# Patient Record
Sex: MALE | Race: WHITE | NOT HISPANIC OR LATINO | Employment: OTHER | URBAN - METROPOLITAN AREA
[De-identification: names, ages, dates, MRNs, and addresses within clinical notes are randomized per-mention and may not be internally consistent; named-entity substitution may affect disease eponyms.]

---

## 2023-08-31 PROBLEM — K21.9 GASTROESOPHAGEAL REFLUX DISEASE: Status: ACTIVE | Noted: 2023-08-31

## 2023-08-31 PROBLEM — E78.2 MIXED HYPERLIPIDEMIA: Status: ACTIVE | Noted: 2023-08-31

## 2023-08-31 PROBLEM — I25.118 ATHEROSCLEROTIC HEART DISEASE OF NATIVE CORONARY ARTERY WITH OTHER FORMS OF ANGINA PECTORIS (CMS-HCC): Status: ACTIVE | Noted: 2023-08-31

## 2023-08-31 PROBLEM — R73.01 IMPAIRED FASTING GLUCOSE: Status: ACTIVE | Noted: 2023-08-31

## 2023-08-31 PROBLEM — M54.30 SCIATICA: Status: ACTIVE | Noted: 2023-08-31

## 2023-08-31 PROBLEM — I70.90 ARTERIOSCLEROTIC VASCULAR DISEASE: Status: ACTIVE | Noted: 2023-08-31

## 2023-08-31 PROBLEM — N40.0 BENIGN PROSTATIC HYPERPLASIA: Status: ACTIVE | Noted: 2023-08-31

## 2023-08-31 PROBLEM — E78.00 PURE HYPERCHOLESTEROLEMIA: Status: ACTIVE | Noted: 2023-08-31

## 2023-08-31 PROBLEM — M51.369 DEGENERATION OF LUMBAR INTERVERTEBRAL DISC: Status: ACTIVE | Noted: 2023-08-31

## 2023-08-31 PROBLEM — M51.36 DEGENERATION OF LUMBAR INTERVERTEBRAL DISC: Status: ACTIVE | Noted: 2023-08-31

## 2023-08-31 PROBLEM — M19.90 OSTEOARTHRITIS: Status: ACTIVE | Noted: 2023-08-31

## 2023-08-31 PROBLEM — I10 ESSENTIAL HYPERTENSION: Status: ACTIVE | Noted: 2023-08-31

## 2023-08-31 PROBLEM — I20.9 ANGINA PECTORIS (CMS-HCC): Status: ACTIVE | Noted: 2023-08-31

## 2023-08-31 PROBLEM — R00.1 BRADYCARDIA: Status: ACTIVE | Noted: 2023-08-31

## 2023-08-31 PROBLEM — M54.16 LUMBAR RADICULITIS: Status: ACTIVE | Noted: 2023-08-31

## 2023-08-31 PROBLEM — M46.1 SACROILIITIS, NOT ELSEWHERE CLASSIFIED (CMS-HCC): Status: ACTIVE | Noted: 2023-08-31

## 2023-08-31 PROBLEM — M47.816 LUMBAR SPONDYLOSIS: Status: ACTIVE | Noted: 2023-08-31

## 2023-08-31 PROBLEM — R42 DIZZINESS AND GIDDINESS: Status: ACTIVE | Noted: 2023-08-31

## 2023-08-31 PROBLEM — M70.20 OLECRANON BURSITIS: Status: ACTIVE | Noted: 2023-08-31

## 2023-08-31 PROBLEM — M77.10 LATERAL EPICONDYLITIS: Status: ACTIVE | Noted: 2023-08-31

## 2023-08-31 RX ORDER — OMEPRAZOLE 20 MG/1
CAPSULE, DELAYED RELEASE ORAL
COMMUNITY

## 2023-08-31 RX ORDER — ATENOLOL AND CHLORTHALIDONE TABLET 50; 25 MG/1; MG/1
0.5 TABLET ORAL DAILY
COMMUNITY
End: 2024-03-25 | Stop reason: WASHOUT

## 2023-08-31 RX ORDER — DICLOFENAC SODIUM 10 MG/G
GEL TOPICAL
COMMUNITY
Start: 2019-11-05

## 2023-08-31 RX ORDER — ATORVASTATIN CALCIUM 80 MG/1
80 TABLET, FILM COATED ORAL DAILY
COMMUNITY
End: 2024-02-12

## 2023-08-31 RX ORDER — CHOLECALCIFEROL (VITAMIN D3) 50 MCG
TABLET ORAL DAILY
COMMUNITY
Start: 2010-12-08

## 2023-08-31 RX ORDER — ATENOLOL 25 MG/1
25 TABLET ORAL DAILY
COMMUNITY
End: 2024-01-02 | Stop reason: ALTCHOICE

## 2023-10-19 ENCOUNTER — LAB (OUTPATIENT)
Dept: LAB | Facility: LAB | Age: 78
End: 2023-10-19
Payer: MEDICARE

## 2023-10-19 DIAGNOSIS — I25.10 ATHEROSCLEROTIC HEART DISEASE OF NATIVE CORONARY ARTERY WITHOUT ANGINA PECTORIS: ICD-10-CM

## 2023-10-19 DIAGNOSIS — Z12.5 ENCOUNTER FOR SCREENING FOR MALIGNANT NEOPLASM OF PROSTATE: Primary | ICD-10-CM

## 2023-10-19 LAB
ALBUMIN SERPL-MCNC: 4.3 G/DL (ref 3.5–5)
ALP BLD-CCNC: 73 U/L (ref 35–125)
ALT SERPL-CCNC: 33 U/L (ref 5–40)
ANION GAP SERPL CALC-SCNC: 12 MMOL/L
AST SERPL-CCNC: 25 U/L (ref 5–40)
BILIRUB SERPL-MCNC: 0.6 MG/DL (ref 0.1–1.2)
BUN SERPL-MCNC: 24 MG/DL (ref 8–25)
CALCIUM SERPL-MCNC: 8.8 MG/DL (ref 8.5–10.4)
CHLORIDE SERPL-SCNC: 104 MMOL/L (ref 97–107)
CHOLEST SERPL-MCNC: 115 MG/DL (ref 133–200)
CHOLEST/HDLC SERPL: 2.5 {RATIO}
CO2 SERPL-SCNC: 25 MMOL/L (ref 24–31)
CREAT SERPL-MCNC: 1 MG/DL (ref 0.4–1.6)
GFR SERPL CREATININE-BSD FRML MDRD: 78 ML/MIN/1.73M*2
GLUCOSE SERPL-MCNC: 115 MG/DL (ref 65–99)
HDLC SERPL-MCNC: 46 MG/DL
LDLC SERPL CALC-MCNC: 53 MG/DL (ref 65–130)
POTASSIUM SERPL-SCNC: 4.8 MMOL/L (ref 3.4–5.1)
PROT SERPL-MCNC: 6.6 G/DL (ref 5.9–7.9)
PSA SERPL-MCNC: 1 NG/ML
SODIUM SERPL-SCNC: 141 MMOL/L (ref 133–145)
TRIGL SERPL-MCNC: 79 MG/DL (ref 40–150)

## 2023-10-19 PROCEDURE — 80053 COMPREHEN METABOLIC PANEL: CPT

## 2023-10-19 PROCEDURE — G0103 PSA SCREENING: HCPCS

## 2023-10-19 PROCEDURE — 80061 LIPID PANEL: CPT

## 2023-10-19 PROCEDURE — 36415 COLL VENOUS BLD VENIPUNCTURE: CPT

## 2023-11-28 ENCOUNTER — APPOINTMENT (OUTPATIENT)
Dept: PRIMARY CARE | Facility: CLINIC | Age: 78
End: 2023-11-28
Payer: MEDICARE

## 2024-01-02 ENCOUNTER — OFFICE VISIT (OUTPATIENT)
Dept: PRIMARY CARE | Facility: CLINIC | Age: 79
End: 2024-01-02
Payer: MEDICARE

## 2024-01-02 VITALS
HEIGHT: 72 IN | OXYGEN SATURATION: 98 % | WEIGHT: 253 LBS | SYSTOLIC BLOOD PRESSURE: 117 MMHG | TEMPERATURE: 97.5 F | BODY MASS INDEX: 34.27 KG/M2 | DIASTOLIC BLOOD PRESSURE: 66 MMHG | HEART RATE: 57 BPM

## 2024-01-02 DIAGNOSIS — I10 ESSENTIAL HYPERTENSION: ICD-10-CM

## 2024-01-02 DIAGNOSIS — E78.2 MIXED HYPERLIPIDEMIA: ICD-10-CM

## 2024-01-02 DIAGNOSIS — R73.01 IMPAIRED FASTING GLUCOSE: ICD-10-CM

## 2024-01-02 DIAGNOSIS — Z00.00 ROUTINE GENERAL MEDICAL EXAMINATION AT HEALTH CARE FACILITY: Primary | ICD-10-CM

## 2024-01-02 DIAGNOSIS — M15.9 PRIMARY OSTEOARTHRITIS INVOLVING MULTIPLE JOINTS: ICD-10-CM

## 2024-01-02 DIAGNOSIS — I70.90 ARTERIOSCLEROTIC VASCULAR DISEASE: ICD-10-CM

## 2024-01-02 DIAGNOSIS — N40.0 BENIGN PROSTATIC HYPERPLASIA WITHOUT LOWER URINARY TRACT SYMPTOMS: ICD-10-CM

## 2024-01-02 DIAGNOSIS — K63.5 POLYP OF COLON, UNSPECIFIED PART OF COLON, UNSPECIFIED TYPE: ICD-10-CM

## 2024-01-02 PROCEDURE — 1126F AMNT PAIN NOTED NONE PRSNT: CPT | Performed by: INTERNAL MEDICINE

## 2024-01-02 PROCEDURE — G0439 PPPS, SUBSEQ VISIT: HCPCS | Performed by: INTERNAL MEDICINE

## 2024-01-02 PROCEDURE — 99215 OFFICE O/P EST HI 40 MIN: CPT | Performed by: INTERNAL MEDICINE

## 2024-01-02 PROCEDURE — 3074F SYST BP LT 130 MM HG: CPT | Performed by: INTERNAL MEDICINE

## 2024-01-02 PROCEDURE — 1036F TOBACCO NON-USER: CPT | Performed by: INTERNAL MEDICINE

## 2024-01-02 PROCEDURE — 1159F MED LIST DOCD IN RCRD: CPT | Performed by: INTERNAL MEDICINE

## 2024-01-02 PROCEDURE — 3078F DIAST BP <80 MM HG: CPT | Performed by: INTERNAL MEDICINE

## 2024-01-02 ASSESSMENT — PATIENT HEALTH QUESTIONNAIRE - PHQ9
1. LITTLE INTEREST OR PLEASURE IN DOING THINGS: NOT AT ALL
SUM OF ALL RESPONSES TO PHQ9 QUESTIONS 1 AND 2: 0
1. LITTLE INTEREST OR PLEASURE IN DOING THINGS: NOT AT ALL
2. FEELING DOWN, DEPRESSED OR HOPELESS: NOT AT ALL
2. FEELING DOWN, DEPRESSED OR HOPELESS: NOT AT ALL
SUM OF ALL RESPONSES TO PHQ9 QUESTIONS 1 AND 2: 0

## 2024-01-02 ASSESSMENT — ENCOUNTER SYMPTOMS
OCCASIONAL FEELINGS OF UNSTEADINESS: 0
PALPITATIONS: 0
LOSS OF SENSATION IN FEET: 0
SHORTNESS OF BREATH: 0
DEPRESSION: 0

## 2024-01-02 ASSESSMENT — PAIN SCALES - GENERAL: PAINLEVEL: 0-NO PAIN

## 2024-01-02 NOTE — PROGRESS NOTES
[facility]: [facility]  MEDICARE WELLNESS EXAM      Matthew Martínez is a 78 y.o. male that is presenting today for Annual Exam.    Assessment/Plan    Diagnoses and all orders for this visit:  Routine general medical examination at health care facility  Arteriosclerotic vascular disease  Comments:  Control risk factors, labs OK. Recheck prior to  next visit.  Essential hypertension  Comments:  BP doing OK. Would stay on 1/2 table atenolol/chlorathalidone 50/25 to prevent slow heart rate, etc.  Mixed hyperlipidemia  Comments:  Cholesterol levels doing well.  Orders:  -     Lipid Panel; Future  -     Comprehensive Metabolic Panel; Future  Impaired fasting glucose  -     Hemoglobin A1C; Future  -     CBC; Future  Benign prostatic hyperplasia without lower urinary tract symptoms  Comments:  6/24 PSA reorder.  Orders:  -     Prostate Specific Antigen; Future  Polyp of colon, unspecified part of colon, unspecified type  Comments:  10/21  recheck 10/24 last scope?  Primary osteoarthritis involving multiple joints    ADVANCED CARE PLANNING  Advanced Care Planning was discussed with patient:  The patient has an active advanced care plan on file. The patient has an active surrogate decision-maker on file.  Encouraged the patient to confirm that Living Will and Healthcare Power of  (HCPoA) are accurate and up to date.  Encouraged the patient to confirm that our office be provided a copy of any documentation in the event that anything changes.    ACTIVITIES OF DAILY LIVING  Basic ADLs:  Bathing: Independent, Dressing: Independent, Toileting: Independent, Transferring: Independent, Continence:     Independent, Feeding: Independent.    Instrumental ADLs:  Ability to use phone: Independent, Shopping: Independent, Cooking: Independent, House-keeping: Independent, Laundry: Independent, Transportation: Independent, Medication Management: Independent, Finance Management:  Independent.    Subjective   Wellness visit. Over all health status doing well. Diet reviewed, Mediterranean, low sugar diet suggested. No  active depression, or little interest in doing activites or hopelessness. Home safety reviewed, well light, no throw rugs,etc. No falls. Advanced directives filled out at home.  ADL, and instrumental ADL no limits doing well. Vision screen eye exam yearly, hearing screen 6 ft whisper test normal.  No cognitive decline observed. Screening sheet given.    Review of Systems   Respiratory:  Negative for shortness of breath.    Cardiovascular:  Negative for chest pain and palpitations.   All other systems reviewed and are negative.    Objective   Vitals:    01/02/24 1256   BP: 117/66   Pulse: 57   Temp: 36.4 °C (97.5 °F)   SpO2: 98%      Body mass index is 34.31 kg/m².  Physical Exam  Constitutional:       General: He is not in acute distress.     Appearance: He is obese. He is not toxic-appearing.   HENT:      Head: Normocephalic and atraumatic.      Right Ear: Tympanic membrane and ear canal normal.      Left Ear: Tympanic membrane and ear canal normal.      Nose: Nose normal.      Mouth/Throat:      Pharynx: Oropharynx is clear.   Eyes:      Extraocular Movements: Extraocular movements intact.      Pupils: Pupils are equal, round, and reactive to light.   Cardiovascular:      Rate and Rhythm: Normal rate and regular rhythm.      Heart sounds: Normal heart sounds. No murmur heard.  Pulmonary:      Breath sounds: Normal breath sounds.   Abdominal:      General: Bowel sounds are normal. There is no distension.      Palpations: Abdomen is soft. There is no mass.      Tenderness: There is no abdominal tenderness.      Comments: Diastasis recti   Musculoskeletal:         General: No swelling or tenderness.      Right lower leg: No edema.      Left lower leg: No edema.   Skin:     General: Skin is warm and dry.   Neurological:      General: No focal deficit present.      Mental Status:  "He is oriented to person, place, and time.      Sensory: No sensory deficit.      Motor: No weakness.      Deep Tendon Reflexes: Reflexes normal.     Diagnostic Results   Lab Results   Component Value Date    GLUCOSE 115 (H) 10/19/2023    CALCIUM 8.8 10/19/2023     10/19/2023    K 4.8 10/19/2023    CO2 25 10/19/2023     10/19/2023    BUN 24 10/19/2023    CREATININE 1.00 10/19/2023     Lab Results   Component Value Date    ALT 33 10/19/2023    AST 25 10/19/2023    ALKPHOS 73 10/19/2023    BILITOT 0.6 10/19/2023     Lab Results   Component Value Date    WBC 8.8 10/18/2022    HGB 14.8 10/18/2022    HCT 44.0 10/18/2022    MCV 93.6 10/18/2022     10/18/2022     Lab Results   Component Value Date    CHOL 115 (L) 10/19/2023    CHOL 102 (L) 10/18/2022    CHOL 115 (L) 06/15/2021     Lab Results   Component Value Date    HDL 46.0 10/19/2023    HDL 43 10/18/2022    HDL 41 06/15/2021     Lab Results   Component Value Date    LDLCALC 53 (L) 10/19/2023    LDLCALC 43 (L) 10/18/2022    LDLCALC 54 (L) 06/15/2021     Lab Results   Component Value Date    TRIG 79 10/19/2023    TRIG 80 10/18/2022    TRIG 99 06/15/2021     No components found for: \"CHOLHDL\"  Lab Results   Component Value Date    HGBA1C 5.9 12/21/2021     Other labs not included in the list above reviewed either before or during this encounter.    History   Past Medical History:   Diagnosis Date   • Arteriosclerotic vascular disease 08/31/2023    STENT, CAD 1/08 .   • Arteriosclerotic vascular disease 08/31/2023    Hx STENT , risk factor control.   • Benign prostatic hyperplasia 08/31/2023    10/22 PSA 0.9   • Benign prostatic hyperplasia 08/31/2023    10/22 PSA 0.9, 6/24 PSA reorder   • Essential hypertension 08/31/2023   • Gastroesophageal reflux disease 08/31/2023    U/S RUQ fatty liver 12/18   • History of basal cell cancer     left forearm  11/17, 1/24 yrly rec.   • Impaired fasting glucose 08/31/2023   • Osteoarthritis " 08/31/2023   • Pure hypercholesterolemia 08/31/2023     History reviewed. No pertinent surgical history.  Family History   Problem Relation Name Age of Onset   • Other (Rheumatic heart disease) Mother     • Heart disease Mother     • Lung cancer Father       Social History     Socioeconomic History   • Marital status: Unknown     Spouse name: Not on file   • Number of children: Not on file   • Years of education: Not on file   • Highest education level: Not on file   Occupational History   • Not on file   Tobacco Use   • Smoking status: Never     Passive exposure: Never   • Smokeless tobacco: Never   Vaping Use   • Vaping Use: Never used   Substance and Sexual Activity   • Alcohol use: Yes   • Drug use: Never   • Sexual activity: Not on file   Other Topics Concern   • Not on file   Social History Narrative   • Not on file     Social Determinants of Health     Financial Resource Strain: Not on file   Food Insecurity: Not on file   Transportation Needs: Not on file   Physical Activity: Not on file   Stress: Not on file   Social Connections: Not on file   Intimate Partner Violence: Not on file   Housing Stability: Not on file     No Known Allergies  Current Outpatient Medications on File Prior to Visit   Medication Sig Dispense Refill   • atenoloL-chlorthalidone (Tenoretic) 50-25 mg tablet Take 0.5 tablets by mouth once daily.     • atorvastatin (Lipitor) 80 mg tablet Take 1 tablet (80 mg) by mouth once daily.     • cholecalciferol (Vitamin D-3) 50 MCG (2000 UT) tablet Take by mouth once daily. With food     • diclofenac sodium (Voltaren) 1 % gel gel as directed Topical application every 4-6 hours as needed     • MULTIVITAMIN ORAL Take 1 tablet by mouth once daily.     • omeprazole (PriLOSEC) 20 mg DR capsule Oral for 90     • [DISCONTINUED] aspirin (ASPIR-81 ORAL) Take 1 tablet by mouth once daily.     • [DISCONTINUED] atenolol (Tenormin) 25 mg tablet Take 1 tablet (25 mg) by mouth once daily.       No current  facility-administered medications on file prior to visit.     Immunization History   Administered Date(s) Administered   • Flu vaccine, quadrivalent, high-dose, preservative free, age 65y+ (FLUZONE) 12/08/2020, 10/01/2021, 10/18/2022   • Influenza, High Dose Seasonal, Preservative Free 12/14/2016, 12/04/2018   • Influenza, injectable, quadrivalent 12/05/2017   • Influenza, seasonal, injectable 12/08/2010, 12/04/2012, 11/26/2013, 12/03/2015   • Moderna COVID-19 vaccine, bivalent, blue cap/gray label *Check age/dose* 10/28/2022   • Moderna SARS-CoV-2 Vaccination 03/09/2021, 04/06/2021, 01/06/2022   • Pneumococcal conjugate vaccine, 13-valent (PREVNAR 13) 12/03/2015   • Pneumococcal polysaccharide vaccine, 23-valent, age 2 years and older (PNEUMOVAX 23) 12/04/2012, 12/04/2018   • Tdap vaccine, age 7 year and older (BOOSTRIX) 12/08/2010, 12/18/2020   • Zoster vaccine, recombinant, adult (SHINGRIX) 02/21/2023, 05/30/2023   • Zoster, live 01/11/2011     Patient's medical history was reviewed and updated either before or during this encounter.     Michael Merritt MD

## 2024-02-12 DIAGNOSIS — I10 ESSENTIAL HYPERTENSION: Primary | ICD-10-CM

## 2024-02-12 RX ORDER — ATORVASTATIN CALCIUM 80 MG/1
80 TABLET, FILM COATED ORAL DAILY
Qty: 90 TABLET | Refills: 3 | Status: SHIPPED | OUTPATIENT
Start: 2024-02-12 | End: 2025-02-06

## 2024-02-12 RX ORDER — ATENOLOL 50 MG/1
25 TABLET ORAL 2 TIMES DAILY
COMMUNITY
Start: 2023-12-31

## 2024-02-12 NOTE — TELEPHONE ENCOUNTER
Pharmacy is requesting a refill on the pt behalf      Requested Prescriptions     Pending Prescriptions Disp Refills    atorvastatin (Lipitor) 80 mg tablet [Pharmacy Med Name: Atorvastatin Calcium Oral Tablet 80 MG] 90 tablet 3     Sig: Take 1 tablet (80 mg) by mouth once daily.

## 2024-03-25 ENCOUNTER — OFFICE VISIT (OUTPATIENT)
Dept: CARDIOLOGY | Facility: CLINIC | Age: 79
End: 2024-03-25
Payer: MEDICARE

## 2024-03-25 VITALS
WEIGHT: 250.2 LBS | SYSTOLIC BLOOD PRESSURE: 126 MMHG | RESPIRATION RATE: 18 BRPM | HEIGHT: 72 IN | TEMPERATURE: 98.9 F | DIASTOLIC BLOOD PRESSURE: 64 MMHG | OXYGEN SATURATION: 96 % | HEART RATE: 60 BPM | BODY MASS INDEX: 33.89 KG/M2

## 2024-03-25 DIAGNOSIS — I70.90 ARTERIOSCLEROTIC VASCULAR DISEASE: ICD-10-CM

## 2024-03-25 DIAGNOSIS — I25.118 ATHEROSCLEROTIC HEART DISEASE OF NATIVE CORONARY ARTERY WITH OTHER FORMS OF ANGINA PECTORIS (CMS-HCC): Primary | ICD-10-CM

## 2024-03-25 DIAGNOSIS — R42 DIZZINESS AND GIDDINESS: ICD-10-CM

## 2024-03-25 DIAGNOSIS — R00.1 BRADYCARDIA: ICD-10-CM

## 2024-03-25 PROCEDURE — 3078F DIAST BP <80 MM HG: CPT | Performed by: INTERNAL MEDICINE

## 2024-03-25 PROCEDURE — 1159F MED LIST DOCD IN RCRD: CPT | Performed by: INTERNAL MEDICINE

## 2024-03-25 PROCEDURE — 3074F SYST BP LT 130 MM HG: CPT | Performed by: INTERNAL MEDICINE

## 2024-03-25 PROCEDURE — 1157F ADVNC CARE PLAN IN RCRD: CPT | Performed by: INTERNAL MEDICINE

## 2024-03-25 PROCEDURE — 1126F AMNT PAIN NOTED NONE PRSNT: CPT | Performed by: INTERNAL MEDICINE

## 2024-03-25 PROCEDURE — 99213 OFFICE O/P EST LOW 20 MIN: CPT | Performed by: INTERNAL MEDICINE

## 2024-03-25 PROCEDURE — 1036F TOBACCO NON-USER: CPT | Performed by: INTERNAL MEDICINE

## 2024-03-25 ASSESSMENT — ENCOUNTER SYMPTOMS
OCCASIONAL FEELINGS OF UNSTEADINESS: 1
DEPRESSION: 0
LOSS OF SENSATION IN FEET: 0

## 2024-03-25 ASSESSMENT — PATIENT HEALTH QUESTIONNAIRE - PHQ9
1. LITTLE INTEREST OR PLEASURE IN DOING THINGS: NOT AT ALL
2. FEELING DOWN, DEPRESSED OR HOPELESS: NOT AT ALL
2. FEELING DOWN, DEPRESSED OR HOPELESS: NOT AT ALL
SUM OF ALL RESPONSES TO PHQ9 QUESTIONS 1 AND 2: 0
1. LITTLE INTEREST OR PLEASURE IN DOING THINGS: NOT AT ALL
SUM OF ALL RESPONSES TO PHQ9 QUESTIONS 1 AND 2: 0

## 2024-03-25 ASSESSMENT — PAIN SCALES - GENERAL: PAINLEVEL: 0-NO PAIN

## 2024-03-25 NOTE — PROGRESS NOTES
Subjective   Chief Complaint   Patient presents with    Follow-up     Mr. Martínez is present for his 6 month Follow up with Dr. Cotton         78-year-old male patient history of hypertension hyperlipidemia history of CAD, PCI of LAD in 2018.  Currently on aspirin, beta-blocker, Lipitor.  No active angina or CHF sign symptoms.  Stressful situation at home.  Family history of lung cancer as well as heart conditions.  Fairly active.  Patient profile done on October last year for cholesterol 115 LDL is 53 triglycerides 79 PSA was 1.0    Past Medical History:   Diagnosis Date    Angina pectoris (CMS/HCC) 08/31/2023    Arteriosclerotic vascular disease 08/31/2023    STENT, CAD 1/08 . ASA stopped GI SE    Atherosclerotic heart disease of native coronary artery with other forms of angina pectoris (CMS/HCC) 08/31/2023    Benign prostatic hyperplasia 08/31/2023    10/22 PSA 0.9    Benign prostatic hyperplasia 08/31/2023    10/22 PSA 0.9, 6/24 PSA reorder    Bradycardia 08/31/2023    Essential hypertension 08/31/2023    Gastroesophageal reflux disease 08/31/2023    U/S RUQ fatty liver 12/18    History of basal cell cancer     left forearm  11/17, 1/24 yrly rec.    Impaired fasting glucose 08/31/2023    Mixed hyperlipidemia 08/31/2023    Osteoarthritis 08/31/2023    Pure hypercholesterolemia 08/31/2023     History reviewed. No pertinent surgical history.  No relevant family history has been documented for this patient.  Current Outpatient Medications   Medication Sig Dispense Refill    atenolol (Tenormin) 50 mg tablet Take 0.5 tablets (25 mg) by mouth 2 times a day.      atorvastatin (Lipitor) 80 mg tablet Take 1 tablet (80 mg) by mouth once daily. 90 tablet 3    cholecalciferol (Vitamin D-3) 50 MCG (2000 UT) tablet Take by mouth once daily. With food      MULTIVITAMIN ORAL Take 1 tablet by mouth once daily.      omeprazole (PriLOSEC) 20 mg DR capsule Oral for 90      diclofenac sodium (Voltaren) 1 % gel gel as  directed Topical application every 4-6 hours as needed       No current facility-administered medications for this visit.      reports that he has never smoked. He has never been exposed to tobacco smoke. He has never used smokeless tobacco. He reports current alcohol use of about 1.0 standard drink of alcohol per week. He reports that he does not use drugs.  Patient has no known allergies.  Atherosclerotic heart disease of native coronary artery with other forms of angina pectoris (CMS/HCC)    Vitals:    03/25/24 1322   BP: 126/64   Pulse: 60   Resp: 18   Temp: 37.2 °C (98.9 °F)   TempSrc: Core   SpO2: 96%   Weight: 113 kg (250 lb 3.2 oz)   Height: 1.829 m (6')   PainSc: 0-No pain      BMI:Body mass index is 33.93 kg/m².   General Cardiology:  General Appearance: Alert, oriented and in no acute distress.  HEENT: extra ocular movements intact (EOMI), pupils equal,  round, reactive to light and accommodation (PERRLA).  Carotid Upstroke: no bruit, normal.  Jugular Venous Distention (JVD): flat.  Chest: normal.  Lungs: Clear to auscultation,   Heart Sounds: no S3 or S4, normal S1, S2, regular rate.  Murmur, Click, Gallop: soft systolic murmur.  Abdomen: no hepatomegaly, no masses felt, soft.  Extremities: no leg edema.  Peripheral pulses: 2 plus bilateral.  NEUROLOGY Cranial nerves II-XII grossly intact.     Patient Active Problem List   Diagnosis    Angina pectoris (CMS/HCC)    Arteriosclerotic vascular disease    Atherosclerotic heart disease of native coronary artery with other forms of angina pectoris (CMS/HCC)    Benign prostatic hyperplasia    Bradycardia    Degeneration of lumbar intervertebral disc    Dizziness and giddiness    Essential hypertension    Gastroesophageal reflux disease    Impaired fasting glucose    Lateral epicondylitis    Lumbar radiculitis    Lumbar spondylosis    Mixed hyperlipidemia    Olecranon bursitis    Osteoarthritis    Pure hypercholesterolemia    Sacroiliitis, not elsewhere  classified (CMS/formerly [facility])    Sciatica       Problem List Items Addressed This Visit          Cardiac and Vasculature    Arteriosclerotic vascular disease    Atherosclerotic heart disease of native coronary artery with other forms of angina pectoris (CMS/formerly [facility]) - Primary    Bradycardia       Symptoms and Signs    Dizziness and giddiness      78-year-old male patient with history of hypertension, CAD, hyperlipidemia.  1.  CAD: Continue current statin therapy LDL is less than 70 mg/dL.  2.  Hypertension: Continue current atenolol for blood pressure management  No dizziness lightheadedness the moment.  Remained stable at the moment.  Rest patient check blood pressure periodically at home.  Modify risk factor.  Pt. care time is spent includes review of diagnostic tests, labs, radiographs, EKGs, old echoes, cardiac work-up and coordination of care. Assessment, impression and plans are reflected in the note above as well as the orders.    Marshall Cotton MD

## 2024-06-15 DIAGNOSIS — I70.90 ARTERIOSCLEROTIC VASCULAR DISEASE: ICD-10-CM

## 2024-06-15 DIAGNOSIS — I10 ESSENTIAL HYPERTENSION: ICD-10-CM

## 2024-06-16 NOTE — TELEPHONE ENCOUNTER
Pharmacy is requesting refill on behalf of pt    Requested Prescriptions     Pending Prescriptions Disp Refills    atenolol (Tenormin) 50 mg tablet [Pharmacy Med Name: Atenolol Oral Tablet 50 MG] 90 tablet 3     Sig: Take 0.5 tablets (25 mg) by mouth 2 times a day.

## 2024-06-17 RX ORDER — ATENOLOL 50 MG/1
25 TABLET ORAL 2 TIMES DAILY
Qty: 90 TABLET | Refills: 3 | Status: SHIPPED | OUTPATIENT
Start: 2024-06-17 | End: 2025-06-12

## 2024-07-02 DIAGNOSIS — K21.9 GASTROESOPHAGEAL REFLUX DISEASE WITHOUT ESOPHAGITIS: Primary | ICD-10-CM

## 2024-07-02 RX ORDER — OMEPRAZOLE 20 MG/1
20 CAPSULE, DELAYED RELEASE ORAL DAILY
Qty: 90 CAPSULE | Refills: 0 | Status: SHIPPED | OUTPATIENT
Start: 2024-07-02

## 2024-07-05 ENCOUNTER — LAB (OUTPATIENT)
Dept: LAB | Facility: LAB | Age: 79
End: 2024-07-05
Payer: MEDICARE

## 2024-07-05 DIAGNOSIS — E78.2 MIXED HYPERLIPIDEMIA: ICD-10-CM

## 2024-07-05 DIAGNOSIS — N40.0 BENIGN PROSTATIC HYPERPLASIA WITHOUT LOWER URINARY TRACT SYMPTOMS: ICD-10-CM

## 2024-07-05 DIAGNOSIS — R73.01 IMPAIRED FASTING GLUCOSE: ICD-10-CM

## 2024-07-05 LAB
ALBUMIN SERPL-MCNC: 4.3 G/DL (ref 3.5–5)
ALP BLD-CCNC: 69 U/L (ref 35–125)
ALT SERPL-CCNC: 32 U/L (ref 5–40)
ANION GAP SERPL CALC-SCNC: 10 MMOL/L
AST SERPL-CCNC: 27 U/L (ref 5–40)
BILIRUB SERPL-MCNC: 0.7 MG/DL (ref 0.1–1.2)
BUN SERPL-MCNC: 22 MG/DL (ref 8–25)
CALCIUM SERPL-MCNC: 9 MG/DL (ref 8.5–10.4)
CHLORIDE SERPL-SCNC: 106 MMOL/L (ref 97–107)
CHOLEST SERPL-MCNC: 104 MG/DL (ref 133–200)
CHOLEST/HDLC SERPL: 2.4 {RATIO}
CO2 SERPL-SCNC: 25 MMOL/L (ref 24–31)
CREAT SERPL-MCNC: 1.2 MG/DL (ref 0.4–1.6)
EGFRCR SERPLBLD CKD-EPI 2021: 63 ML/MIN/1.73M*2
ERYTHROCYTE [DISTWIDTH] IN BLOOD BY AUTOMATED COUNT: 13.7 % (ref 11.5–14.5)
EST. AVERAGE GLUCOSE BLD GHB EST-MCNC: 117 MG/DL
GLUCOSE SERPL-MCNC: 122 MG/DL (ref 65–99)
HBA1C MFR BLD: 5.7 %
HCT VFR BLD AUTO: 43.3 % (ref 41–52)
HDLC SERPL-MCNC: 43 MG/DL
HGB BLD-MCNC: 14.5 G/DL (ref 13.5–17.5)
LDLC SERPL CALC-MCNC: 45 MG/DL (ref 65–130)
MCH RBC QN AUTO: 31.3 PG (ref 26–34)
MCHC RBC AUTO-ENTMCNC: 33.5 G/DL (ref 32–36)
MCV RBC AUTO: 94 FL (ref 80–100)
NRBC BLD-RTO: 0 /100 WBCS (ref 0–0)
PLATELET # BLD AUTO: 155 X10*3/UL (ref 150–450)
POTASSIUM SERPL-SCNC: 4.3 MMOL/L (ref 3.4–5.1)
PROT SERPL-MCNC: 6.5 G/DL (ref 5.9–7.9)
PSA SERPL-MCNC: 0.9 NG/ML
RBC # BLD AUTO: 4.63 X10*6/UL (ref 4.5–5.9)
SODIUM SERPL-SCNC: 141 MMOL/L (ref 133–145)
TRIGL SERPL-MCNC: 81 MG/DL (ref 40–150)
WBC # BLD AUTO: 8.1 X10*3/UL (ref 4.4–11.3)

## 2024-07-05 PROCEDURE — 83036 HEMOGLOBIN GLYCOSYLATED A1C: CPT

## 2024-07-05 PROCEDURE — 85027 COMPLETE CBC AUTOMATED: CPT

## 2024-07-05 PROCEDURE — 36415 COLL VENOUS BLD VENIPUNCTURE: CPT

## 2024-07-05 PROCEDURE — 80061 LIPID PANEL: CPT

## 2024-07-05 PROCEDURE — 80053 COMPREHEN METABOLIC PANEL: CPT

## 2024-07-05 PROCEDURE — 84153 ASSAY OF PSA TOTAL: CPT

## 2024-07-09 ENCOUNTER — OFFICE VISIT (OUTPATIENT)
Dept: PRIMARY CARE | Facility: CLINIC | Age: 79
End: 2024-07-09
Payer: MEDICARE

## 2024-07-09 VITALS
WEIGHT: 250 LBS | DIASTOLIC BLOOD PRESSURE: 66 MMHG | BODY MASS INDEX: 33.86 KG/M2 | HEIGHT: 72 IN | SYSTOLIC BLOOD PRESSURE: 129 MMHG | HEART RATE: 53 BPM | TEMPERATURE: 97.8 F | OXYGEN SATURATION: 95 %

## 2024-07-09 DIAGNOSIS — K21.9 GASTROESOPHAGEAL REFLUX DISEASE WITHOUT ESOPHAGITIS: ICD-10-CM

## 2024-07-09 DIAGNOSIS — R73.01 IMPAIRED FASTING GLUCOSE: ICD-10-CM

## 2024-07-09 DIAGNOSIS — I10 ESSENTIAL HYPERTENSION: ICD-10-CM

## 2024-07-09 DIAGNOSIS — E78.2 MIXED HYPERLIPIDEMIA: ICD-10-CM

## 2024-07-09 DIAGNOSIS — I10 PRIMARY HYPERTENSION: Primary | ICD-10-CM

## 2024-07-09 DIAGNOSIS — I70.90 ARTERIOSCLEROTIC VASCULAR DISEASE: ICD-10-CM

## 2024-07-09 PROBLEM — M46.1 SACROILIITIS, NOT ELSEWHERE CLASSIFIED (CMS-HCC): Status: RESOLVED | Noted: 2023-08-31 | Resolved: 2024-07-09

## 2024-07-09 PROCEDURE — G2211 COMPLEX E/M VISIT ADD ON: HCPCS | Performed by: INTERNAL MEDICINE

## 2024-07-09 PROCEDURE — 1125F AMNT PAIN NOTED PAIN PRSNT: CPT | Performed by: INTERNAL MEDICINE

## 2024-07-09 PROCEDURE — 1036F TOBACCO NON-USER: CPT | Performed by: INTERNAL MEDICINE

## 2024-07-09 PROCEDURE — 3078F DIAST BP <80 MM HG: CPT | Performed by: INTERNAL MEDICINE

## 2024-07-09 PROCEDURE — 99214 OFFICE O/P EST MOD 30 MIN: CPT | Performed by: INTERNAL MEDICINE

## 2024-07-09 PROCEDURE — 1159F MED LIST DOCD IN RCRD: CPT | Performed by: INTERNAL MEDICINE

## 2024-07-09 PROCEDURE — 3074F SYST BP LT 130 MM HG: CPT | Performed by: INTERNAL MEDICINE

## 2024-07-09 PROCEDURE — 1157F ADVNC CARE PLAN IN RCRD: CPT | Performed by: INTERNAL MEDICINE

## 2024-07-09 RX ORDER — ATORVASTATIN CALCIUM 80 MG/1
80 TABLET, FILM COATED ORAL DAILY
Qty: 90 TABLET | Refills: 3 | Status: SHIPPED | OUTPATIENT
Start: 2024-07-09 | End: 2025-07-04

## 2024-07-09 RX ORDER — OMEPRAZOLE 20 MG/1
20 CAPSULE, DELAYED RELEASE ORAL DAILY
Qty: 90 CAPSULE | Refills: 2 | Status: SHIPPED | OUTPATIENT
Start: 2024-07-09

## 2024-07-09 RX ORDER — ATENOLOL 25 MG/1
25 TABLET ORAL DAILY
Qty: 90 TABLET | Refills: 3 | Status: SHIPPED | OUTPATIENT
Start: 2024-07-09 | End: 2025-07-09

## 2024-07-09 ASSESSMENT — PATIENT HEALTH QUESTIONNAIRE - PHQ9
2. FEELING DOWN, DEPRESSED OR HOPELESS: NOT AT ALL
SUM OF ALL RESPONSES TO PHQ9 QUESTIONS 1 AND 2: 0
1. LITTLE INTEREST OR PLEASURE IN DOING THINGS: NOT AT ALL

## 2024-07-09 ASSESSMENT — PAIN SCALES - GENERAL: PAINLEVEL: 1

## 2024-07-09 ASSESSMENT — ENCOUNTER SYMPTOMS
PALPITATIONS: 0
SHORTNESS OF BREATH: 0
HYPERTENSION: 1

## 2024-07-09 NOTE — PATIENT INSTRUCTIONS
February, labs 1-2 weeks before apt.    Diagnoses and all orders for this visit:  Primary hypertension  Comments:  BP doing OK. Atenolol 25 mg sent in refill.  Mixed hyperlipidemia  Comments:  LDL 45 on atorvastatin HX heart diseae, risk factor control.  Orders:  -     Comprehensive Metabolic Panel; Future  -     Lipid Panel; Future  Impaired fasting glucose  Comments:  HGA1C 5.7% low sugar diet, weight loss, exericse, walking 30 minutes daily especially after meals.  Orders:  -     CBC and Auto Differential; Future  -     Hemoglobin A1C; Future  Gastroesophageal reflux disease without esophagitis  Comments:  stable.  Orders:  -     omeprazole (PriLOSEC) 20 mg DR capsule; Take 1 capsule (20 mg) by mouth once daily.  Arteriosclerotic vascular disease  Comments:   controlled risk factors.  Essential hypertension  -     atenolol (Tenormin) 25 mg tablet; Take 1 tablet (25 mg) by mouth once daily.  -     atorvastatin (Lipitor) 80 mg tablet; Take 1 tablet (80 mg) by mouth once daily.  Other orders  -     Follow Up In Primary Care - Established

## 2024-07-09 NOTE — PROGRESS NOTES
[facility]: [facility]  PROGRESS NOTE      Matthew Martínez is a 78 y.o. male that is presenting today for Hypertension (6 mo fu).    Assessment/Plan   Diagnoses and all orders for this visit:  Primary hypertension  Comments:  BP doing OK. Atenolol 25 mg sent in refill.  Mixed hyperlipidemia  Comments:  LDL 45 on atorvastatin HX heart diseae, risk factor control.  Orders:  -     Comprehensive Metabolic Panel; Future  -     Lipid Panel; Future  Impaired fasting glucose  Comments:  HGA1C 5.7% low sugar diet, weight loss, exericse, walking 30 minutes daily especially after meals.  Orders:  -     CBC and Auto Differential; Future  -     Hemoglobin A1C; Future  Gastroesophageal reflux disease without esophagitis  Comments:  stable.  Orders:  -     omeprazole (PriLOSEC) 20 mg DR capsule; Take 1 capsule (20 mg) by mouth once daily.  Arteriosclerotic vascular disease  Comments:   controlled risk factors.  Essential hypertension  -     atenolol (Tenormin) 25 mg tablet; Take 1 tablet (25 mg) by mouth once daily.  -     atorvastatin (Lipitor) 80 mg tablet; Take 1 tablet (80 mg) by mouth once daily.  Other orders  -     Follow Up In Primary Care - Established    Subjective   Follow  up for IFBS, HTN, chol, DJD, GERD.Diet, exeircsie weight loss was reviewed. Labs reviewed.    Hypertension  Pertinent negatives include no chest pain, palpitations or shortness of breath.     Review of Systems   Respiratory:  Negative for shortness of breath.    Cardiovascular:  Negative for chest pain and palpitations.   All other systems reviewed and are negative.     Objective   Vitals:    07/09/24 1302   BP: 129/66   Pulse: 53   Temp: 36.6 °C (97.8 °F)   SpO2: 95%      Body mass index is 33.91 kg/m².  Physical Exam  Constitutional:       General: He is not in acute distress.     Appearance: He is obese. He is not toxic-appearing.   HENT:      Head: Normocephalic and atraumatic.      Right Ear: Tympanic membrane and  ear canal normal.      Left Ear: Tympanic membrane and ear canal normal.      Nose: Nose normal.      Mouth/Throat:      Pharynx: Oropharynx is clear.   Eyes:      Extraocular Movements: Extraocular movements intact.      Pupils: Pupils are equal, round, and reactive to light.   Cardiovascular:      Rate and Rhythm: Normal rate and regular rhythm.      Heart sounds: Normal heart sounds. No murmur heard.  Pulmonary:      Breath sounds: Normal breath sounds.   Abdominal:      General: Bowel sounds are normal. There is no distension.      Palpations: Abdomen is soft. There is no mass.      Tenderness: There is no abdominal tenderness.   Musculoskeletal:         General: No swelling or tenderness.      Right lower leg: No edema.      Left lower leg: No edema.   Skin:     General: Skin is warm and dry.   Neurological:      General: No focal deficit present.      Mental Status: He is oriented to person, place, and time.      Sensory: No sensory deficit.      Motor: No weakness.      Deep Tendon Reflexes: Reflexes normal.     Diagnostic Results   Lab Results   Component Value Date    GLUCOSE 122 (H) 07/05/2024    CALCIUM 9.0 07/05/2024     07/05/2024    K 4.3 07/05/2024    CO2 25 07/05/2024     07/05/2024    BUN 22 07/05/2024    CREATININE 1.20 07/05/2024     Lab Results   Component Value Date    ALT 32 07/05/2024    AST 27 07/05/2024    ALKPHOS 69 07/05/2024    BILITOT 0.7 07/05/2024     Lab Results   Component Value Date    WBC 8.1 07/05/2024    HGB 14.5 07/05/2024    HCT 43.3 07/05/2024    MCV 94 07/05/2024     07/05/2024     Lab Results   Component Value Date    CHOL 104 (L) 07/05/2024    CHOL 115 (L) 10/19/2023    CHOL 102 (L) 10/18/2022     Lab Results   Component Value Date    HDL 43.0 07/05/2024    HDL 46.0 10/19/2023    HDL 43 10/18/2022     Lab Results   Component Value Date    LDLCALC 45 (L) 07/05/2024    LDLCALC 53 (L) 10/19/2023    LDLCALC 43 (L) 10/18/2022     Lab Results   Component Value  "Date    TRIG 81 07/05/2024    TRIG 79 10/19/2023    TRIG 80 10/18/2022     No components found for: \"CHOLHDL\"  Lab Results   Component Value Date    HGBA1C 5.7 (H) 07/05/2024     Other labs not included in the list above were reviewed either before or during this encounter.    History    Past Medical History:   Diagnosis Date   • Angina pectoris (CMS-HCC) 08/31/2023   • Arteriosclerotic vascular disease 08/31/2023    STENT, CAD 1/08 . ASA stopped GI SE   • Atherosclerotic heart disease of native coronary artery with other forms of angina pectoris (CMS-HCC) 08/31/2023   • Benign prostatic hyperplasia 08/31/2023    10/22 PSA 0.9, 6/24 PSA 0.90   • Bradycardia 08/31/2023   • Essential hypertension 08/31/2023   • Gastroesophageal reflux disease 08/31/2023    U/S RUQ fatty liver 12/18   • History of basal cell cancer     left forearm  11/17, 1/24 yrly rec.   • Impaired fasting glucose 08/31/2023   • Mixed hyperlipidemia 08/31/2023   • Osteoarthritis 08/31/2023   • Pure hypercholesterolemia 08/31/2023     History reviewed. No pertinent surgical history.  Family History   Problem Relation Name Age of Onset   • Other (Rheumatic heart disease) Mother     • Heart disease Mother     • Lung cancer Father       Social History     Socioeconomic History   • Marital status:      Spouse name: Not on file   • Number of children: Not on file   • Years of education: Not on file   • Highest education level: Not on file   Occupational History   • Not on file   Tobacco Use   • Smoking status: Never     Passive exposure: Never   • Smokeless tobacco: Never   Vaping Use   • Vaping status: Never Used   Substance and Sexual Activity   • Alcohol use: Yes     Alcohol/week: 1.0 standard drink of alcohol     Types: 1 Standard drinks or equivalent per week     Comment: Occassional   • Drug use: Never   • Sexual activity: Yes     Partners: Female   Other Topics Concern   • Not on file   Social History Narrative   • Not on " file     Social Determinants of Health     Financial Resource Strain: Not on file   Food Insecurity: Not on file   Transportation Needs: Not on file   Physical Activity: Not on file   Stress: Not on file   Social Connections: Not on file   Intimate Partner Violence: Not on file   Housing Stability: Not on file     No Known Allergies  Current Outpatient Medications on File Prior to Visit   Medication Sig Dispense Refill   • cholecalciferol (Vitamin D-3) 50 MCG (2000 UT) tablet Take by mouth once daily. With food     • diclofenac sodium (Voltaren) 1 % gel gel as directed Topical application every 4-6 hours as needed     • MULTIVITAMIN ORAL Take 1 tablet by mouth once daily.     • [DISCONTINUED] atenolol (Tenormin) 50 mg tablet Take 0.5 tablets (25 mg) by mouth 2 times a day. (Patient taking differently: Take 0.5 tablets (25 mg) by mouth once daily.) 90 tablet 3   • [DISCONTINUED] atorvastatin (Lipitor) 80 mg tablet Take 1 tablet (80 mg) by mouth once daily. 90 tablet 3   • [DISCONTINUED] omeprazole (PriLOSEC) 20 mg DR capsule TAKE ONE CAPSULE BY MOUTH EVERY DAY 90 capsule 0     No current facility-administered medications on file prior to visit.     Immunization History   Administered Date(s) Administered   • Flu vaccine, quadrivalent, high-dose, preservative free, age 65y+ (FLUZONE) 12/08/2020, 10/01/2021, 10/18/2022   • Influenza, High Dose Seasonal, Preservative Free 12/14/2016, 12/04/2018   • Influenza, Seasonal, Quadrivalent, Adjuvanted 09/21/2023   • Influenza, injectable, quadrivalent 12/05/2017   • Influenza, seasonal, injectable 12/08/2010, 12/04/2012, 11/26/2013, 12/03/2015   • Moderna COVID-19 vaccine, bivalent, blue cap/gray label *Check age/dose* 10/28/2022   • Moderna SARS-CoV-2 Vaccination 03/09/2021, 04/06/2021, 01/06/2022   • Pfizer COVID-19 vaccine, Fall 2023, 12 years and older, (30mcg/0.3mL) 09/21/2023   • Pneumococcal conjugate vaccine, 13-valent (PREVNAR 13) 12/03/2015   • Pneumococcal  polysaccharide vaccine, 23-valent, age 2 years and older (PNEUMOVAX 23) 12/04/2012, 12/04/2018   • Tdap vaccine, age 7 year and older (BOOSTRIX, ADACEL) 12/08/2010, 12/18/2020   • Zoster vaccine, recombinant, adult (SHINGRIX) 02/21/2023, 05/30/2023   • Zoster, live 01/11/2011     Patient's medical history was reviewed and updated either before or during this encounter.       Michael Merritt MD

## 2024-09-23 ENCOUNTER — APPOINTMENT (OUTPATIENT)
Dept: CARDIOLOGY | Facility: CLINIC | Age: 79
End: 2024-09-23
Payer: MEDICARE

## 2024-09-23 VITALS
OXYGEN SATURATION: 94 % | BODY MASS INDEX: 33.38 KG/M2 | SYSTOLIC BLOOD PRESSURE: 130 MMHG | RESPIRATION RATE: 16 BRPM | WEIGHT: 246.4 LBS | HEIGHT: 72 IN | DIASTOLIC BLOOD PRESSURE: 63 MMHG | HEART RATE: 55 BPM

## 2024-09-23 DIAGNOSIS — I25.118 ATHEROSCLEROTIC HEART DISEASE OF NATIVE CORONARY ARTERY WITH OTHER FORMS OF ANGINA PECTORIS: ICD-10-CM

## 2024-09-23 DIAGNOSIS — I70.90 ARTERIOSCLEROTIC VASCULAR DISEASE: ICD-10-CM

## 2024-09-23 DIAGNOSIS — I10 PRIMARY HYPERTENSION: ICD-10-CM

## 2024-09-23 DIAGNOSIS — E78.2 MIXED HYPERLIPIDEMIA: ICD-10-CM

## 2024-09-23 DIAGNOSIS — I20.9 ANGINA PECTORIS: Primary | ICD-10-CM

## 2024-09-23 PROCEDURE — 1160F RVW MEDS BY RX/DR IN RCRD: CPT | Performed by: INTERNAL MEDICINE

## 2024-09-23 PROCEDURE — 3075F SYST BP GE 130 - 139MM HG: CPT | Performed by: INTERNAL MEDICINE

## 2024-09-23 PROCEDURE — 3078F DIAST BP <80 MM HG: CPT | Performed by: INTERNAL MEDICINE

## 2024-09-23 PROCEDURE — 1159F MED LIST DOCD IN RCRD: CPT | Performed by: INTERNAL MEDICINE

## 2024-09-23 PROCEDURE — 1157F ADVNC CARE PLAN IN RCRD: CPT | Performed by: INTERNAL MEDICINE

## 2024-09-23 PROCEDURE — 99214 OFFICE O/P EST MOD 30 MIN: CPT | Performed by: INTERNAL MEDICINE

## 2024-09-23 PROCEDURE — 1036F TOBACCO NON-USER: CPT | Performed by: INTERNAL MEDICINE

## 2024-09-23 PROCEDURE — 1126F AMNT PAIN NOTED NONE PRSNT: CPT | Performed by: INTERNAL MEDICINE

## 2024-09-23 RX ORDER — NAPROXEN SODIUM 220 MG/1
81 TABLET, FILM COATED ORAL DAILY
Qty: 30 TABLET | Refills: 11 | Status: SHIPPED | OUTPATIENT
Start: 2024-09-23 | End: 2025-09-23

## 2024-09-23 ASSESSMENT — LIFESTYLE VARIABLES
HOW MANY STANDARD DRINKS CONTAINING ALCOHOL DO YOU HAVE ON A TYPICAL DAY: PATIENT DOES NOT DRINK
SKIP TO QUESTIONS 9-10: 1
HAVE YOU OR SOMEONE ELSE BEEN INJURED AS A RESULT OF YOUR DRINKING: NO
AUDIT-C TOTAL SCORE: 0
HOW OFTEN DO YOU HAVE SIX OR MORE DRINKS ON ONE OCCASION: NEVER
HAS A RELATIVE, FRIEND, DOCTOR, OR ANOTHER HEALTH PROFESSIONAL EXPRESSED CONCERN ABOUT YOUR DRINKING OR SUGGESTED YOU CUT DOWN: NO
AUDIT TOTAL SCORE: 0
HOW OFTEN DO YOU HAVE A DRINK CONTAINING ALCOHOL: NEVER

## 2024-09-23 ASSESSMENT — ENCOUNTER SYMPTOMS
LOSS OF SENSATION IN FEET: 0
OCCASIONAL FEELINGS OF UNSTEADINESS: 0
DEPRESSION: 0

## 2024-09-23 ASSESSMENT — PAIN SCALES - GENERAL: PAINLEVEL: 0-NO PAIN

## 2024-09-23 NOTE — PROGRESS NOTES
Subjective   Chief Complaint   Patient presents with    Follow-up     Pt here today for 6 month follow up. No current complaints       78-year-old patient with a history of hypertension-history of PCI of LAD in the past.  No active angina CHF signs symptoms.  Weight loss few pounds.  So far stable.  No active angina or CHF sign symptoms.  Stents in LAD.  Patient had a AAA screening for aorta essentially was unremarkable about 6 to 8 years ago.  Patient is CBC within 2 months ago was unremarkable.  Comprehensive metabolic panel 2 months ago also within normal range.  Lipid profile was done 2 months ago showed total cholesterol 104, LDL is 45, triglyceride 81.  Hemoglobin A1c 5.7%.  PSA was done was 0.9.  Past Medical History:   Diagnosis Date    Angina pectoris (CMS-HCC) 08/31/2023    Arteriosclerotic vascular disease 08/31/2023    STENT, CAD 1/08 . ASA stopped GI SE    Atherosclerotic heart disease of native coronary artery with other forms of angina pectoris (CMS-HCC) 08/31/2023    Benign prostatic hyperplasia 08/31/2023    10/22 PSA 0.9, 6/24 PSA 0.90    Bradycardia 08/31/2023    Essential hypertension 08/31/2023    Gastroesophageal reflux disease 08/31/2023    U/S RUQ fatty liver 12/18    History of basal cell cancer     left forearm  11/17, 1/24 yrly rec.    Impaired fasting glucose 08/31/2023    Mixed hyperlipidemia 08/31/2023    Osteoarthritis 08/31/2023    Pure hypercholesterolemia 08/31/2023     History reviewed. No pertinent surgical history.  No relevant family history has been documented for this patient.  Current Outpatient Medications   Medication Sig Dispense Refill    atenolol (Tenormin) 25 mg tablet Take 1 tablet (25 mg) by mouth once daily. 90 tablet 3    atorvastatin (Lipitor) 80 mg tablet Take 1 tablet (80 mg) by mouth once daily. 90 tablet 3    cholecalciferol (Vitamin D-3) 50 MCG (2000 UT) tablet Take by mouth once daily. With food      diclofenac sodium (Voltaren) 1 % gel gel  as directed Topical application every 4-6 hours as needed      MULTIVITAMIN ORAL Take 1 tablet by mouth once daily.      omeprazole (PriLOSEC) 20 mg DR capsule Take 1 capsule (20 mg) by mouth once daily. 90 capsule 2     No current facility-administered medications for this visit.      reports that he has never smoked. He has never been exposed to tobacco smoke. He has never used smokeless tobacco. He reports current alcohol use of about 1.0 standard drink of alcohol per week. He reports that he does not use drugs.  Allergies:  Patient has no known allergies.    ROS: See HPI  CONSTITUTIONAL: Chills- none. Fever- none. Weight change appropriate for age.  HEENT: Headache- Negative.  Change in vision- none.  Ear pain- none. Nasal congestion- none. Post-nasal drip-none.  Sore throat-none.  CARDIOLOGY: Chest pain- none.  Leg edema-trace.  Murmurs-soft systolic.  Palpitation- none.  RESPIRATORY: Denies any shortness of breath.  GI: Abdominal pain- none.  Change in bowel habits- none.  Constipation- none.  Diarrhea- none.  Nausea- none.  Vomiting- none.  MUSCULOSKELETAL: Joint pain- none.  Muscle aches- none.  DERMATOLOGY: Rash- none.  NEUROLOGY: Dizziness- none.   Headache- none.  PSYCHIATRY: Denies any depression or anxiety     Vitals:    09/23/24 1340   BP: 130/63   Pulse: 55   Resp: 16   SpO2: 94%   Weight: 112 kg (246 lb 6.4 oz)   Height: 1.829 m (6')   PainSc: 0-No pain      BMI:Body mass index is 33.42 kg/m².   General Cardiology:  General Appearance: Alert, oriented and in no acute distress.  HEENT: extra ocular movements intact (EOMI), pupils equal,  round, reactive to light and accommodation (PERRLA).  Carotid Upstroke: no bruit, normal.  Jugular Venous Distention (JVD): flat.  Chest: normal.  Lungs: Clear to auscultation,   Heart Sounds: no S3 or S4, normal S1, S2, regular rate.  Murmur, Click, Gallop: soft systolic murmur.  Abdomen: no hepatomegaly, no masses felt, soft.  Extremities: no leg edema.  Peripheral  pulses: 2 plus bilateral.  NEUROLOGY Cranial nerves II-XII grossly intact.     Patient Active Problem List   Diagnosis    Angina pectoris (CMS-[facility])    Arteriosclerotic vascular disease    Atherosclerotic heart disease of native coronary artery with other forms of angina pectoris (CMS-HCC)    Benign prostatic hyperplasia    Bradycardia    Degeneration of intervertebral disc of lumbar region    Primary hypertension    Gastroesophageal reflux disease    Impaired fasting glucose    Lateral epicondylitis    Lumbar radiculitis    Lumbar spondylosis    Mixed hyperlipidemia    Olecranon bursitis    Osteoarthritis    Pure hypercholesterolemia    Sciatica       Problem List Items Addressed This Visit    None     78-year-old patient with a history of hypertension hyperlipidemia history of CAD.  Stable cardiac wise.  1.  Hypertension: Continue current atenolol 25 mg tablet p.o. daily.  2.  Hyperlipidemia: Continue current Lipitor 80 mg tablet p.o. daily.  Lipid profile within normal range.  3.  CAD: Advised patient to start aspirin 81 mg once a day.    Advised patient to avoid lunch meats, canned soups, pizzas, bread rolls, and sandwiches. Advised patient to limit salt intake 1,500 mg daily. Advised patient to exercise 30 mins/3 times a week including treadmill or aerobic type, Goal to achieve 65% target HR.    Diet and exercise reviewed with patient..advice to walk about 10,000 steps or about 2 hours during day time. Cut back on salt, sugar and flour.    Pt. care time is spent includes review of diagnostic tests, labs, radiographs, EKGs, old echoes, cardiac work-up and coordination of care. Assessment, impression and plans are reflected in the note above as well as the orders.    Marshall Cotton MD   yes

## 2025-01-15 ENCOUNTER — APPOINTMENT (OUTPATIENT)
Dept: PRIMARY CARE | Facility: CLINIC | Age: 80
End: 2025-01-15
Payer: MEDICARE

## 2025-02-09 DIAGNOSIS — I10 ESSENTIAL HYPERTENSION: ICD-10-CM

## 2025-02-10 RX ORDER — ATORVASTATIN CALCIUM 80 MG/1
80 TABLET, FILM COATED ORAL DAILY
Qty: 90 TABLET | Refills: 3 | Status: SHIPPED | OUTPATIENT
Start: 2025-02-10 | End: 2026-02-05

## 2025-02-17 ENCOUNTER — APPOINTMENT (OUTPATIENT)
Dept: PRIMARY CARE | Facility: CLINIC | Age: 80
End: 2025-02-17
Payer: MEDICARE

## 2025-03-18 ENCOUNTER — APPOINTMENT (OUTPATIENT)
Dept: CARDIOLOGY | Facility: CLINIC | Age: 80
End: 2025-03-18
Payer: MEDICARE

## 2025-04-08 ENCOUNTER — APPOINTMENT (OUTPATIENT)
Dept: CARDIOLOGY | Facility: CLINIC | Age: 80
End: 2025-04-08
Payer: MEDICARE

## 2025-04-08 VITALS
BODY MASS INDEX: 33.46 KG/M2 | OXYGEN SATURATION: 97 % | DIASTOLIC BLOOD PRESSURE: 66 MMHG | HEART RATE: 62 BPM | WEIGHT: 247 LBS | SYSTOLIC BLOOD PRESSURE: 129 MMHG | HEIGHT: 72 IN | RESPIRATION RATE: 16 BRPM

## 2025-04-08 DIAGNOSIS — I25.118 ATHEROSCLEROTIC HEART DISEASE OF NATIVE CORONARY ARTERY WITH OTHER FORMS OF ANGINA PECTORIS: ICD-10-CM

## 2025-04-08 DIAGNOSIS — I10 PRIMARY HYPERTENSION: ICD-10-CM

## 2025-04-08 DIAGNOSIS — I20.9 ANGINA PECTORIS: ICD-10-CM

## 2025-04-08 DIAGNOSIS — I10 ESSENTIAL HYPERTENSION: ICD-10-CM

## 2025-04-08 DIAGNOSIS — I70.90 ARTERIOSCLEROTIC VASCULAR DISEASE: ICD-10-CM

## 2025-04-08 DIAGNOSIS — E78.2 MIXED HYPERLIPIDEMIA: ICD-10-CM

## 2025-04-08 PROCEDURE — 99214 OFFICE O/P EST MOD 30 MIN: CPT | Performed by: INTERNAL MEDICINE

## 2025-04-08 PROCEDURE — 1126F AMNT PAIN NOTED NONE PRSNT: CPT | Performed by: INTERNAL MEDICINE

## 2025-04-08 PROCEDURE — 3074F SYST BP LT 130 MM HG: CPT | Performed by: INTERNAL MEDICINE

## 2025-04-08 PROCEDURE — 1036F TOBACCO NON-USER: CPT | Performed by: INTERNAL MEDICINE

## 2025-04-08 PROCEDURE — G2211 COMPLEX E/M VISIT ADD ON: HCPCS | Performed by: INTERNAL MEDICINE

## 2025-04-08 PROCEDURE — 3078F DIAST BP <80 MM HG: CPT | Performed by: INTERNAL MEDICINE

## 2025-04-08 PROCEDURE — 1157F ADVNC CARE PLAN IN RCRD: CPT | Performed by: INTERNAL MEDICINE

## 2025-04-08 PROCEDURE — 1160F RVW MEDS BY RX/DR IN RCRD: CPT | Performed by: INTERNAL MEDICINE

## 2025-04-08 PROCEDURE — 1159F MED LIST DOCD IN RCRD: CPT | Performed by: INTERNAL MEDICINE

## 2025-04-08 RX ORDER — ATENOLOL 25 MG/1
25 TABLET ORAL DAILY
Qty: 90 TABLET | Refills: 3 | Status: SHIPPED | OUTPATIENT
Start: 2025-04-08 | End: 2026-04-08

## 2025-04-08 RX ORDER — NAPROXEN SODIUM 220 MG/1
81 TABLET, FILM COATED ORAL DAILY
Qty: 90 TABLET | Refills: 3 | Status: SHIPPED | OUTPATIENT
Start: 2025-04-08 | End: 2025-04-10 | Stop reason: WASHOUT

## 2025-04-08 RX ORDER — ATORVASTATIN CALCIUM 80 MG/1
80 TABLET, FILM COATED ORAL DAILY
Qty: 90 TABLET | Refills: 3 | Status: SHIPPED | OUTPATIENT
Start: 2025-04-08 | End: 2026-04-03

## 2025-04-08 RX ORDER — SEMAGLUTIDE 0.25 MG/.5ML
0.25 INJECTION, SOLUTION SUBCUTANEOUS WEEKLY
Qty: 0.5 ML | Refills: 3 | Status: SHIPPED | OUTPATIENT
Start: 2025-04-08 | End: 2025-04-10 | Stop reason: ALTCHOICE

## 2025-04-08 ASSESSMENT — LIFESTYLE VARIABLES
AUDIT TOTAL SCORE: 1
HAVE YOU OR SOMEONE ELSE BEEN INJURED AS A RESULT OF YOUR DRINKING: NO
SKIP TO QUESTIONS 9-10: 1
HOW OFTEN DO YOU HAVE A DRINK CONTAINING ALCOHOL: MONTHLY OR LESS
HOW OFTEN DO YOU HAVE SIX OR MORE DRINKS ON ONE OCCASION: NEVER
HOW MANY STANDARD DRINKS CONTAINING ALCOHOL DO YOU HAVE ON A TYPICAL DAY: 1 OR 2
HAS A RELATIVE, FRIEND, DOCTOR, OR ANOTHER HEALTH PROFESSIONAL EXPRESSED CONCERN ABOUT YOUR DRINKING OR SUGGESTED YOU CUT DOWN: NO
AUDIT-C TOTAL SCORE: 1

## 2025-04-08 ASSESSMENT — ENCOUNTER SYMPTOMS
LOSS OF SENSATION IN FEET: 0
OCCASIONAL FEELINGS OF UNSTEADINESS: 0
DEPRESSION: 0

## 2025-04-08 ASSESSMENT — PATIENT HEALTH QUESTIONNAIRE - PHQ9
1. LITTLE INTEREST OR PLEASURE IN DOING THINGS: NOT AT ALL
SUM OF ALL RESPONSES TO PHQ9 QUESTIONS 1 AND 2: 0
2. FEELING DOWN, DEPRESSED OR HOPELESS: NOT AT ALL

## 2025-04-08 ASSESSMENT — PAIN SCALES - GENERAL: PAINLEVEL_OUTOF10: 0-NO PAIN

## 2025-04-08 NOTE — PROGRESS NOTES
Subjective   Chief Complaint   Patient presents with    Follow-up     Mr. Martínez is present for his 6 month Follow up with Dr. Cotton        79-year-old patient with a history of hypertension hyperlipidemia patient was last seen in the September.  Patient with a history of PCI of LAD in the past.  History of AAA screening in the past essentially unremarkable.  History of bradycardia on a beta-blocker.  Asymptomatic.  No active chest pain or tightness.  Patient have not had any recent lab done.  Last was in July.  Last CBC was normal.  Comprehensive metabolic panel done about in July he was unremarkable except glucose elevated.  Lipid profile was done in July was total cholesterol 104 with LDL was 145.  Hemoglobin A1c 9 months ago 5.7%.  Past Medical History:   Diagnosis Date    Angina pectoris 08/31/2023    Arteriosclerotic vascular disease 08/31/2023    STENT, CAD 1/08 . ASA stopped GI SE    Atherosclerotic heart disease of native coronary artery with other forms of angina pectoris 08/31/2023    Benign prostatic hyperplasia 08/31/2023    10/22 PSA 0.9, 6/24 PSA 0.90    Bradycardia 08/31/2023    Essential hypertension 08/31/2023    Gastroesophageal reflux disease 08/31/2023    U/S RUQ fatty liver 12/18    History of basal cell cancer     left forearm  11/17, 1/24 yrly rec.    Impaired fasting glucose 08/31/2023    Mixed hyperlipidemia 08/31/2023    Osteoarthritis 08/31/2023    Pure hypercholesterolemia 08/31/2023     History reviewed. No pertinent surgical history.  No relevant family history has been documented for this patient.  Current Outpatient Medications   Medication Sig Dispense Refill    aspirin 81 mg chewable tablet Chew 1 tablet (81 mg) once daily. 30 tablet 11    atenolol (Tenormin) 25 mg tablet Take 1 tablet (25 mg) by mouth once daily. 90 tablet 3    atorvastatin (Lipitor) 80 mg tablet Take 1 tablet (80 mg) by mouth once daily. 90 tablet 3    cholecalciferol (Vitamin D-3) 50 MCG (2000  UT) tablet Take by mouth once daily. With food      diclofenac sodium (Voltaren) 1 % gel gel as directed Topical application every 4-6 hours as needed      MULTIVITAMIN ORAL Take 1 tablet by mouth once daily.      omeprazole (PriLOSEC) 20 mg DR capsule Take 1 capsule (20 mg) by mouth once daily. 90 capsule 2     No current facility-administered medications for this visit.      reports that he has never smoked. He has never been exposed to tobacco smoke. He has never used smokeless tobacco. He reports current alcohol use of about 1.0 standard drink of alcohol per week. He reports that he does not use drugs.  Allergies:  Patient has no known allergies.    ROS: See HPI  CONSTITUTIONAL: Chills- none. Fever- none. Weight change appropriate for age.  HEENT: Headache- Negative.  Change in vision- none.  Ear pain- none. Nasal congestion- none. Post-nasal drip-none.  Sore throat-none.  CARDIOLOGY: Chest pain- none.  Leg edema-trace.  Murmurs-soft systolic.  Palpitation- none.  RESPIRATORY: Denies any shortness of breath.  GI: Abdominal pain- none.  Change in bowel habits- none.  Constipation- none.  Diarrhea- none.  Nausea- none.  Vomiting- none.  MUSCULOSKELETAL: Joint pain- none.  Muscle aches- none.  DERMATOLOGY: Rash- none.  NEUROLOGY: Dizziness- none.   Headache- none.  PSYCHIATRY: Denies any depression or anxiety     Vitals:    04/08/25 1301   BP: 129/66   Pulse: 62   Resp: 16   SpO2: 97%   Weight: 112 kg (247 lb)   Height: 1.829 m (6')   PainSc: 0-No pain      BMI:Body mass index is 33.5 kg/m².   General Cardiology:  General Appearance: Alert, oriented and in no acute distress.  HEENT: extra ocular movements intact (EOMI), pupils equal,  round, reactive to light and accommodation (PERRLA).  Carotid Upstroke: no bruit, normal.  Jugular Venous Distention (JVD): flat.  Chest: normal.  Lungs: Clear to auscultation,   Heart Sounds: no S3 or S4, normal S1, S2, regular rate.  Murmur, Click, Gallop: no systolic  murmur.  Abdomen: no hepatomegaly, no masses felt, soft.  Extremities: no leg edema.  Peripheral pulses: 2 plus bilateral.  NEUROLOGY Cranial nerves II-XII grossly intact.     Patient Active Problem List   Diagnosis    Angina pectoris    Arteriosclerotic vascular disease    Atherosclerotic heart disease of native coronary artery with other forms of angina pectoris    Benign prostatic hyperplasia    Bradycardia    Degeneration of intervertebral disc of lumbar region    Primary hypertension    Gastroesophageal reflux disease    Impaired fasting glucose    Lateral epicondylitis    Lumbar radiculitis    Lumbar spondylosis    Mixed hyperlipidemia    Olecranon bursitis    Osteoarthritis    Pure hypercholesterolemia    Sciatica     Problem List Items Addressed This Visit       Angina pectoris    Arteriosclerotic vascular disease    Atherosclerotic heart disease of native coronary artery with other forms of angina pectoris    Primary hypertension    Mixed hyperlipidemia     Other Visit Diagnoses       Essential hypertension               79-year-old patient with a history of hypertension hyperlipidemia history of CAD.  PCI in the past.  PCI of LAD in the past.  Patient with a BMI more than 33.  Currently on multiple medication including aspirin, atenolol, beta-blocker and statin therapy.  1.  CAD: Patient but status post PCI history of MI in the past.  Continue current aspirin and beta-blocker.  2.  Hyperlipidemia: Continue current Lipitor 80 mg tablet p.o. daily.  I will order repeat lipid profile on the patient.  3.  BMI more than 33: Probably benefit from Wegovy to lower cardiovascular major adverse cardiac events.    Advised patient to avoid lunch meats, canned soups, pizzas, bread rolls, and sandwiches. Advised patient to limit salt intake 1,500 mg daily. Advised patient to exercise 30 mins/3 times a week including treadmill or aerobic type, Goal to achieve 65% target HR.    Diet and exercise reviewed with  patient..advice to walk about 10,000 steps or about 2 hours during day time. Cut back on salt, sugar and flour.    Pt. care time is spent includes review of diagnostic tests, labs, radiographs, EKGs, old echoes, cardiac work-up and coordination of care. Assessment, impression and plans are reflected in the note above as well as the orders.    Marshall Cotton MD

## 2025-04-10 ENCOUNTER — OFFICE VISIT (OUTPATIENT)
Dept: PRIMARY CARE | Facility: CLINIC | Age: 80
End: 2025-04-10
Payer: MEDICARE

## 2025-04-10 VITALS
SYSTOLIC BLOOD PRESSURE: 130 MMHG | HEIGHT: 72 IN | BODY MASS INDEX: 33.86 KG/M2 | DIASTOLIC BLOOD PRESSURE: 64 MMHG | WEIGHT: 250 LBS | OXYGEN SATURATION: 96 % | TEMPERATURE: 97.3 F | HEART RATE: 56 BPM

## 2025-04-10 DIAGNOSIS — R53.83 FATIGUE, UNSPECIFIED TYPE: ICD-10-CM

## 2025-04-10 DIAGNOSIS — R73.9 HYPERGLYCEMIA: ICD-10-CM

## 2025-04-10 DIAGNOSIS — I10 PRIMARY HYPERTENSION: ICD-10-CM

## 2025-04-10 DIAGNOSIS — K21.9 GASTROESOPHAGEAL REFLUX DISEASE WITHOUT ESOPHAGITIS: ICD-10-CM

## 2025-04-10 DIAGNOSIS — E55.9 VITAMIN D DEFICIENCY: ICD-10-CM

## 2025-04-10 DIAGNOSIS — Z00.00 ENCOUNTER FOR MEDICARE ANNUAL WELLNESS EXAM: Primary | ICD-10-CM

## 2025-04-10 DIAGNOSIS — Z12.5 ENCOUNTER FOR PROSTATE CANCER SCREENING: ICD-10-CM

## 2025-04-10 DIAGNOSIS — E78.2 MIXED HYPERLIPIDEMIA: ICD-10-CM

## 2025-04-10 DIAGNOSIS — M15.0 PRIMARY OSTEOARTHRITIS INVOLVING MULTIPLE JOINTS: ICD-10-CM

## 2025-04-10 PROCEDURE — 1123F ACP DISCUSS/DSCN MKR DOCD: CPT | Performed by: NURSE PRACTITIONER

## 2025-04-10 PROCEDURE — 99215 OFFICE O/P EST HI 40 MIN: CPT | Performed by: NURSE PRACTITIONER

## 2025-04-10 PROCEDURE — 3075F SYST BP GE 130 - 139MM HG: CPT | Performed by: NURSE PRACTITIONER

## 2025-04-10 PROCEDURE — 3078F DIAST BP <80 MM HG: CPT | Performed by: NURSE PRACTITIONER

## 2025-04-10 PROCEDURE — 1160F RVW MEDS BY RX/DR IN RCRD: CPT | Performed by: NURSE PRACTITIONER

## 2025-04-10 PROCEDURE — 1157F ADVNC CARE PLAN IN RCRD: CPT | Performed by: NURSE PRACTITIONER

## 2025-04-10 PROCEDURE — 1158F ADVNC CARE PLAN TLK DOCD: CPT | Performed by: NURSE PRACTITIONER

## 2025-04-10 PROCEDURE — 1126F AMNT PAIN NOTED NONE PRSNT: CPT | Performed by: NURSE PRACTITIONER

## 2025-04-10 PROCEDURE — G0439 PPPS, SUBSEQ VISIT: HCPCS | Performed by: NURSE PRACTITIONER

## 2025-04-10 PROCEDURE — 1036F TOBACCO NON-USER: CPT | Performed by: NURSE PRACTITIONER

## 2025-04-10 PROCEDURE — 1159F MED LIST DOCD IN RCRD: CPT | Performed by: NURSE PRACTITIONER

## 2025-04-10 RX ORDER — DICLOFENAC SODIUM 10 MG/G
2 GEL TOPICAL 2 TIMES DAILY PRN
Qty: 200 G | Refills: 3 | Status: SHIPPED | OUTPATIENT
Start: 2025-04-10

## 2025-04-10 ASSESSMENT — ENCOUNTER SYMPTOMS
POLYPHAGIA: 0
CHEST TIGHTNESS: 0
OCCASIONAL FEELINGS OF UNSTEADINESS: 0
DYSURIA: 0
WOUND: 0
HEADACHES: 0
BACK PAIN: 0
PALPITATIONS: 0
LOSS OF SENSATION IN FEET: 0
AGITATION: 0
NECK PAIN: 0
HEMATURIA: 0
DIZZINESS: 0
SHORTNESS OF BREATH: 0
BRUISES/BLEEDS EASILY: 0
COUGH: 0
CHILLS: 0
CONFUSION: 0
FLANK PAIN: 0
NAUSEA: 0
VOMITING: 0
FACIAL ASYMMETRY: 0
POLYDIPSIA: 0
ADENOPATHY: 0
FEVER: 0
SEIZURES: 0
DIAPHORESIS: 0
ABDOMINAL PAIN: 0
SPEECH DIFFICULTY: 0
FATIGUE: 0
DEPRESSION: 0

## 2025-04-10 ASSESSMENT — LIFESTYLE VARIABLES
HOW MANY STANDARD DRINKS CONTAINING ALCOHOL DO YOU HAVE ON A TYPICAL DAY: 1 OR 2
HOW OFTEN DURING THE LAST YEAR HAVE YOU HAD A FEELING OF GUILT OR REMORSE AFTER DRINKING: NEVER
HOW OFTEN DURING THE LAST YEAR HAVE YOU NEEDED AN ALCOHOLIC DRINK FIRST THING IN THE MORNING TO GET YOURSELF GOING AFTER A NIGHT OF HEAVY DRINKING: NEVER
HAVE YOU OR SOMEONE ELSE BEEN INJURED AS A RESULT OF YOUR DRINKING: NO
SKIP TO QUESTIONS 9-10: 1
HOW OFTEN DURING THE LAST YEAR HAVE YOU FAILED TO DO WHAT WAS NORMALLY EXPECTED FROM YOU BECAUSE OF DRINKING: NEVER
AUDIT-C TOTAL SCORE: 1
HOW OFTEN DO YOU HAVE A DRINK CONTAINING ALCOHOL: MONTHLY OR LESS
HOW OFTEN DURING THE LAST YEAR HAVE YOU FOUND THAT YOU WERE NOT ABLE TO STOP DRINKING ONCE YOU HAD STARTED: NEVER
HAS A RELATIVE, FRIEND, DOCTOR, OR ANOTHER HEALTH PROFESSIONAL EXPRESSED CONCERN ABOUT YOUR DRINKING OR SUGGESTED YOU CUT DOWN: NO
HOW OFTEN DURING THE LAST YEAR HAVE YOU BEEN UNABLE TO REMEMBER WHAT HAPPENED THE NIGHT BEFORE BECAUSE YOU HAD BEEN DRINKING: NEVER
HOW OFTEN DO YOU HAVE SIX OR MORE DRINKS ON ONE OCCASION: NEVER
AUDIT TOTAL SCORE: 1

## 2025-04-10 ASSESSMENT — PAIN SCALES - GENERAL: PAINLEVEL_OUTOF10: 0-NO PAIN

## 2025-04-10 ASSESSMENT — PATIENT HEALTH QUESTIONNAIRE - PHQ9
1. LITTLE INTEREST OR PLEASURE IN DOING THINGS: NOT AT ALL
2. FEELING DOWN, DEPRESSED OR HOPELESS: NOT AT ALL
SUM OF ALL RESPONSES TO PHQ9 QUESTIONS 1 AND 2: 0

## 2025-04-10 NOTE — PROGRESS NOTES
[facility]: [facility]  MEDICARE WELLNESS EXAM      Matthew Martínez is a 79 y.o. male that is presenting today for Medicare Annual Wellness Exam.    Mr. Martínez reports taking antihypertensive as directed. He is trying to follow a low sodium diet. He is not monitoring home BP. Denies CP, SOB, dizziness, syncope or HA. He is followed by Cardiology, Dr. Cotton.    Tolerating statin. Trying to follow a low cholesterol diet. Denies statin related abdominal pain, myalgias or arthralgias.    GERD symptoms managed with daily PPI. Denies epigastric pain, acid reflux, N/V, blood in stool or change in bowel habits.    OA is managed with topical Diclofenac gel.    Assessment/Plan      Diagnoses and all orders for this visit:    Encounter for Medicare annual wellness exam        -     Routine and preventative care provided and discussed with patient    Primary hypertension  -     Good control  -     Continue established follow up with Cardiology  -     atenolol 25 mg daily  -     CBC and Auto Differential; Future  -     Comprehensive Metabolic Panel; Future    Mixed hyperlipidemia  -     Tolerating statin  -     atorvastatin 80 mg daily  -     Comprehensive Metabolic Panel; Future  -     Lipid Panel; Future    Gastroesophageal reflux disease without esophagitis        -     Stable on PPI        -     omeprazole 20 mg daily    Vitamin D deficiency  -     Vitamin D 25-Hydroxy,Total (for eval of Vitamin D levels); Future  -     Conitnue daily OTC Vitamin D supplement    Primary osteoarthritis involving multiple joints  -     diclofenac sodium (Voltaren) 1 % gel; Apply 2.25 inches (2 g) topically 2 times a day as needed (for pain). as directed Topical application every 4-6 hours as needed    Hyperglycemia  -     Comprehensive Metabolic Panel; Future  -     Hemoglobin A1C; Future    Encounter for prostate cancer screening  -     Prostate Specific Antigen; Future    Fatigue, unspecified type  -     CBC and Auto  Differential; Future  -     Comprehensive Metabolic Panel; Future  -     TSH with reflex to Free T4 if abnormal; Future    Other orders  -     Follow Up In Primary Care - Medicare Annual; Future    ADVANCED CARE PLANNING  Advanced Care Planning was discussed with patient:  The patient has an active surrogate decision-maker on file. The patient does not have an advanced care plan on file.  Encouraged the patient to confirm that Living Will and Healthcare Power of  (HCPoA) are accurate and up to date.  Encouraged the patient to confirm that our office be provided a copy of any documentation in the event that anything changes.    ACTIVITIES OF DAILY LIVING  Basic ADLs:  Bathing: Independent, Dressing: Independent, Toileting: Independent, Transferring: Independent, Continence: Independent, Feeding: Independent.    Instrumental ADLs:  Ability to use phone: Independent, Shopping: Independent, Cooking: Independent, House-keeping: Independent, Laundry: Independent, Transportation: Independent, Medication Management: Independent, Finance Management: Independent.    Subjective   HPI  Review of Systems   Constitutional:  Negative for chills, diaphoresis, fatigue and fever.   HENT:  Negative for hearing loss and mouth sores.    Eyes:  Negative for visual disturbance.   Respiratory:  Negative for cough, chest tightness and shortness of breath.    Cardiovascular:  Negative for chest pain, palpitations and leg swelling.   Gastrointestinal:  Negative for abdominal pain, nausea and vomiting.   Endocrine: Negative for cold intolerance, heat intolerance, polydipsia, polyphagia and polyuria.   Genitourinary:  Negative for dysuria, flank pain and hematuria.   Musculoskeletal:  Negative for back pain and neck pain.   Skin:  Negative for rash and wound.   Allergic/Immunologic: Negative for environmental allergies, food allergies and immunocompromised state.   Neurological:  Negative for dizziness, seizures, syncope, facial  asymmetry, speech difficulty and headaches.   Hematological:  Negative for adenopathy. Does not bruise/bleed easily.   Psychiatric/Behavioral:  Negative for agitation and confusion.      Objective   Vitals:    04/10/25 1424   BP: 130/64   Pulse: 56   Temp: 36.3 °C (97.3 °F)   SpO2: 96%      Body mass index is 33.91 kg/m².  Physical Exam  Vitals and nursing note reviewed.   Constitutional:       General: He is not in acute distress.     Appearance: Normal appearance. He is not ill-appearing.   HENT:      Head: Normocephalic and atraumatic.      Right Ear: Tympanic membrane, ear canal and external ear normal. There is no impacted cerumen.      Left Ear: Tympanic membrane, ear canal and external ear normal. There is no impacted cerumen.      Nose: Nose normal.      Mouth/Throat:      Mouth: Mucous membranes are moist.      Pharynx: Oropharynx is clear. No oropharyngeal exudate or posterior oropharyngeal erythema.   Eyes:      General: No scleral icterus.        Right eye: No discharge.         Left eye: No discharge.      Extraocular Movements: Extraocular movements intact.      Conjunctiva/sclera: Conjunctivae normal.      Pupils: Pupils are equal, round, and reactive to light.   Neck:      Vascular: No carotid bruit.   Cardiovascular:      Rate and Rhythm: Normal rate and regular rhythm.      Pulses: Normal pulses.      Heart sounds: Normal heart sounds. No murmur heard.  Pulmonary:      Effort: Pulmonary effort is normal. No respiratory distress.      Breath sounds: Normal breath sounds.   Abdominal:      General: Abdomen is flat. Bowel sounds are normal. There is no distension.      Palpations: Abdomen is soft. There is no mass.      Tenderness: There is no abdominal tenderness. There is no right CVA tenderness or left CVA tenderness.   Musculoskeletal:         General: Normal range of motion.      Cervical back: Normal range of motion. No tenderness.      Right lower leg: No edema.      Left lower leg: No edema.  "  Lymphadenopathy:      Cervical: No cervical adenopathy.   Skin:     General: Skin is warm and dry.      Coloration: Skin is not jaundiced.      Findings: No rash.   Neurological:      General: No focal deficit present.      Mental Status: He is alert and oriented to person, place, and time. Mental status is at baseline.   Psychiatric:         Mood and Affect: Mood normal.         Behavior: Behavior normal.       Diagnostic Results   Lab Results   Component Value Date    GLUCOSE 122 (H) 07/05/2024    CALCIUM 9.0 07/05/2024     07/05/2024    K 4.3 07/05/2024    CO2 25 07/05/2024     07/05/2024    BUN 22 07/05/2024    CREATININE 1.20 07/05/2024     Lab Results   Component Value Date    ALT 32 07/05/2024    AST 27 07/05/2024    ALKPHOS 69 07/05/2024    BILITOT 0.7 07/05/2024     Lab Results   Component Value Date    WBC 8.1 07/05/2024    HGB 14.5 07/05/2024    HCT 43.3 07/05/2024    MCV 94 07/05/2024     07/05/2024     Lab Results   Component Value Date    CHOL 104 (L) 07/05/2024    CHOL 115 (L) 10/19/2023    CHOL 102 (L) 10/18/2022     Lab Results   Component Value Date    HDL 43.0 07/05/2024    HDL 46.0 10/19/2023    HDL 43 10/18/2022     Lab Results   Component Value Date    LDLCALC 45 (L) 07/05/2024    LDLCALC 53 (L) 10/19/2023    LDLCALC 43 (L) 10/18/2022     Lab Results   Component Value Date    TRIG 81 07/05/2024    TRIG 79 10/19/2023    TRIG 80 10/18/2022     No components found for: \"CHOLHDL\"  Lab Results   Component Value Date    HGBA1C 5.7 (H) 07/05/2024     Other labs not included in the list above reviewed either before or during this encounter.    History   Past Medical History:   Diagnosis Date    Angina pectoris 08/31/2023    Atherosclerotic heart disease of native coronary artery with other forms of angina pectoris 08/31/2023    Benign prostatic hyperplasia 08/31/2023    10/22 PSA 0.9, 6/24 PSA 0.90    Bradycardia 08/31/2023    Gastroesophageal reflux disease 08/31/2023    U/S RUQ " fatty liver 12/18    History of basal cell cancer     left forearm  11/17, 1/24 yrly rec.    Impaired fasting glucose 08/31/2023    Mixed hyperlipidemia 08/31/2023    Osteoarthritis 08/31/2023     History reviewed. No pertinent surgical history.  Family History   Problem Relation Name Age of Onset    Other (Rheumatic heart disease) Mother      Heart disease Mother      Lung cancer Father       Social History     Socioeconomic History    Marital status:      Spouse name: Not on file    Number of children: Not on file    Years of education: Not on file    Highest education level: Not on file   Occupational History    Not on file   Tobacco Use    Smoking status: Never     Passive exposure: Never    Smokeless tobacco: Never   Vaping Use    Vaping status: Never Used   Substance and Sexual Activity    Alcohol use: Yes     Alcohol/week: 1.0 standard drink of alcohol     Types: 1 Standard drinks or equivalent per week     Comment: Occassional    Drug use: Never    Sexual activity: Yes     Partners: Female   Other Topics Concern    Not on file   Social History Narrative    Not on file     Social Drivers of Health     Financial Resource Strain: Not on file   Food Insecurity: Not on file   Transportation Needs: Not on file   Physical Activity: Not on file   Stress: Not on file   Social Connections: Not on file   Intimate Partner Violence: Not on file   Housing Stability: Not on file     No Known Allergies  Current Outpatient Medications on File Prior to Visit   Medication Sig Dispense Refill    atenolol (Tenormin) 25 mg tablet Take 1 tablet (25 mg) by mouth once daily. 90 tablet 3    atorvastatin (Lipitor) 80 mg tablet Take 1 tablet (80 mg) by mouth once daily. 90 tablet 3    cholecalciferol (Vitamin D-3) 50 MCG (2000 UT) tablet Take by mouth once daily. With food      MULTIVITAMIN ORAL Take 1 tablet by mouth once daily.      omeprazole (PriLOSEC) 20 mg DR capsule Take 1 capsule (20 mg) by mouth once daily. 90  capsule 2    [DISCONTINUED] diclofenac sodium (Voltaren) 1 % gel gel as directed Topical application every 4-6 hours as needed      [DISCONTINUED] aspirin 81 mg chewable tablet Chew 1 tablet (81 mg) once daily. 30 tablet 11    [DISCONTINUED] aspirin 81 mg chewable tablet Chew 1 tablet (81 mg) once daily. (Patient not taking: Reported on 4/10/2025) 90 tablet 3    [DISCONTINUED] atenolol (Tenormin) 25 mg tablet Take 1 tablet (25 mg) by mouth once daily. 90 tablet 3    [DISCONTINUED] atorvastatin (Lipitor) 80 mg tablet Take 1 tablet (80 mg) by mouth once daily. 90 tablet 3    [DISCONTINUED] semaglutide, weight loss, (Wegovy) 0.25 mg/0.5 mL pen injector Inject 0.25 mg under the skin 1 (one) time per week. (Patient not taking: Reported on 4/10/2025) 0.5 mL 3     No current facility-administered medications on file prior to visit.     Immunization History   Administered Date(s) Administered    Flu vaccine, quadrivalent, high-dose, preservative free, age 65y+ (FLUZONE) 12/08/2020, 10/01/2021, 10/18/2022    Flu vaccine, trivalent, preservative free, HIGH-DOSE, age 65y+ (Fluzone) 12/14/2016, 12/04/2018    Influenza, Seasonal, Quadrivalent, Adjuvanted 09/21/2023    Influenza, injectable, quadrivalent 12/05/2017    Influenza, seasonal, injectable 12/08/2010, 12/04/2012, 11/26/2013, 12/03/2015    Moderna COVID-19 vaccine, bivalent, blue cap/gray label *Check age/dose* 10/28/2022    Moderna SARS-CoV-2 Vaccination 03/09/2021, 04/06/2021, 01/06/2022    Pfizer COVID-19 vaccine, 12 years and older, (30mcg/0.3mL) (Comirnaty) 09/21/2023    Pneumococcal conjugate vaccine, 13-valent (PREVNAR 13) 12/03/2015    Pneumococcal polysaccharide vaccine, 23-valent, age 2 years and older (PNEUMOVAX 23) 12/04/2012, 12/04/2018    Tdap vaccine, age 7 year and older (BOOSTRIX, ADACEL) 12/08/2010, 12/18/2020    Zoster vaccine, recombinant, adult (SHINGRIX) 02/21/2023, 05/30/2023    Zoster, live 01/11/2011     Patient's medical history was reviewed and  updated either before or during this encounter.     Noreen Garcia, APRN-CNP

## 2025-04-16 LAB
25(OH)D3+25(OH)D2 SERPL-MCNC: 39 NG/ML (ref 30–100)
ALBUMIN SERPL-MCNC: 4.4 G/DL (ref 3.6–5.1)
ALP SERPL-CCNC: 64 U/L (ref 35–144)
ALT SERPL-CCNC: 31 U/L (ref 9–46)
ANION GAP SERPL CALCULATED.4IONS-SCNC: 7 MMOL/L (CALC) (ref 7–17)
AST SERPL-CCNC: 25 U/L (ref 10–35)
BASOPHILS # BLD AUTO: 41 CELLS/UL (ref 0–200)
BASOPHILS NFR BLD AUTO: 0.6 %
BILIRUB SERPL-MCNC: 0.7 MG/DL (ref 0.2–1.2)
BUN SERPL-MCNC: 18 MG/DL (ref 7–25)
CALCIUM SERPL-MCNC: 8.8 MG/DL (ref 8.6–10.3)
CHLORIDE SERPL-SCNC: 108 MMOL/L (ref 98–110)
CHOLEST SERPL-MCNC: 110 MG/DL
CHOLEST/HDLC SERPL: 2.4 (CALC)
CO2 SERPL-SCNC: 27 MMOL/L (ref 20–32)
CREAT SERPL-MCNC: 1.02 MG/DL (ref 0.7–1.28)
EGFRCR SERPLBLD CKD-EPI 2021: 76 ML/MIN/1.73M2
EOSINOPHIL # BLD AUTO: 304 CELLS/UL (ref 15–500)
EOSINOPHIL NFR BLD AUTO: 4.4 %
ERYTHROCYTE [DISTWIDTH] IN BLOOD BY AUTOMATED COUNT: 13.5 % (ref 11–15)
EST. AVERAGE GLUCOSE BLD GHB EST-MCNC: 131 MG/DL
EST. AVERAGE GLUCOSE BLD GHB EST-SCNC: 7.3 MMOL/L
GLUCOSE SERPL-MCNC: 128 MG/DL (ref 65–99)
HBA1C MFR BLD: 6.2 %
HCT VFR BLD AUTO: 42.7 % (ref 38.5–50)
HDLC SERPL-MCNC: 46 MG/DL
HGB BLD-MCNC: 14.7 G/DL (ref 13.2–17.1)
LDLC SERPL CALC-MCNC: 51 MG/DL (CALC)
LYMPHOCYTES # BLD AUTO: 2187 CELLS/UL (ref 850–3900)
LYMPHOCYTES NFR BLD AUTO: 31.7 %
MCH RBC QN AUTO: 31.7 PG (ref 27–33)
MCHC RBC AUTO-ENTMCNC: 34.4 G/DL (ref 32–36)
MCV RBC AUTO: 92 FL (ref 80–100)
MONOCYTES # BLD AUTO: 559 CELLS/UL (ref 200–950)
MONOCYTES NFR BLD AUTO: 8.1 %
NEUTROPHILS # BLD AUTO: 3809 CELLS/UL (ref 1500–7800)
NEUTROPHILS NFR BLD AUTO: 55.2 %
NONHDLC SERPL-MCNC: 64 MG/DL (CALC)
PLATELET # BLD AUTO: 152 THOUSAND/UL (ref 140–400)
PMV BLD REES-ECKER: 9.8 FL (ref 7.5–12.5)
POTASSIUM SERPL-SCNC: 4.3 MMOL/L (ref 3.5–5.3)
PROT SERPL-MCNC: 6.7 G/DL (ref 6.1–8.1)
PSA SERPL-MCNC: 0.9 NG/ML
RBC # BLD AUTO: 4.64 MILLION/UL (ref 4.2–5.8)
SODIUM SERPL-SCNC: 142 MMOL/L (ref 135–146)
TRIGL SERPL-MCNC: 58 MG/DL
TSH SERPL-ACNC: 1.64 MIU/L (ref 0.4–4.5)
WBC # BLD AUTO: 6.9 THOUSAND/UL (ref 3.8–10.8)

## 2025-06-27 DIAGNOSIS — K21.9 GASTROESOPHAGEAL REFLUX DISEASE WITHOUT ESOPHAGITIS: ICD-10-CM

## 2025-06-27 RX ORDER — OMEPRAZOLE 20 MG/1
20 CAPSULE, DELAYED RELEASE ORAL DAILY
Qty: 90 CAPSULE | Refills: 3 | Status: SHIPPED | OUTPATIENT
Start: 2025-06-27

## 2025-08-19 ENCOUNTER — OFFICE VISIT (OUTPATIENT)
Dept: PRIMARY CARE | Facility: CLINIC | Age: 80
End: 2025-08-19
Payer: MEDICARE

## 2025-08-19 VITALS
TEMPERATURE: 98 F | SYSTOLIC BLOOD PRESSURE: 144 MMHG | OXYGEN SATURATION: 97 % | BODY MASS INDEX: 34.4 KG/M2 | HEIGHT: 72 IN | WEIGHT: 254 LBS | DIASTOLIC BLOOD PRESSURE: 71 MMHG | HEART RATE: 57 BPM

## 2025-08-19 DIAGNOSIS — L03.818 CELLULITIS OF OTHER SPECIFIED SITE: Primary | ICD-10-CM

## 2025-08-19 PROCEDURE — 99213 OFFICE O/P EST LOW 20 MIN: CPT | Performed by: NURSE PRACTITIONER

## 2025-08-19 PROCEDURE — 1159F MED LIST DOCD IN RCRD: CPT | Performed by: NURSE PRACTITIONER

## 2025-08-19 PROCEDURE — 3077F SYST BP >= 140 MM HG: CPT | Performed by: NURSE PRACTITIONER

## 2025-08-19 PROCEDURE — 1036F TOBACCO NON-USER: CPT | Performed by: NURSE PRACTITIONER

## 2025-08-19 PROCEDURE — 1126F AMNT PAIN NOTED NONE PRSNT: CPT | Performed by: NURSE PRACTITIONER

## 2025-08-19 PROCEDURE — 3078F DIAST BP <80 MM HG: CPT | Performed by: NURSE PRACTITIONER

## 2025-08-19 RX ORDER — CEPHALEXIN 500 MG/1
500 CAPSULE ORAL 4 TIMES DAILY
Qty: 28 CAPSULE | Refills: 0 | Status: SHIPPED | OUTPATIENT
Start: 2025-08-19 | End: 2025-08-26

## 2025-08-19 ASSESSMENT — PAIN SCALES - GENERAL: PAINLEVEL_OUTOF10: 0-NO PAIN

## 2025-08-19 ASSESSMENT — ENCOUNTER SYMPTOMS
CONSTITUTIONAL NEGATIVE: 1
RESPIRATORY NEGATIVE: 1
CARDIOVASCULAR NEGATIVE: 1

## 2025-10-20 ENCOUNTER — APPOINTMENT (OUTPATIENT)
Dept: CARDIOLOGY | Facility: CLINIC | Age: 80
End: 2025-10-20
Payer: MEDICARE